# Patient Record
Sex: FEMALE | Race: BLACK OR AFRICAN AMERICAN | Employment: UNEMPLOYED | URBAN - METROPOLITAN AREA
[De-identification: names, ages, dates, MRNs, and addresses within clinical notes are randomized per-mention and may not be internally consistent; named-entity substitution may affect disease eponyms.]

---

## 2019-02-07 ENCOUNTER — HOSPITAL ENCOUNTER (EMERGENCY)
Age: 29
Discharge: HOME | End: 2019-02-07
Payer: COMMERCIAL

## 2019-02-07 VITALS
HEART RATE: 98 BPM | SYSTOLIC BLOOD PRESSURE: 149 MMHG | RESPIRATION RATE: 15 BRPM | WEIGHT: 200 LBS | OXYGEN SATURATION: 100 % | TEMPERATURE: 98.2 F | HEIGHT: 67 IN | DIASTOLIC BLOOD PRESSURE: 75 MMHG | BODY MASS INDEX: 31.39 KG/M2

## 2019-02-07 DIAGNOSIS — L03.317 CELLULITIS OF BUTTOCK: Primary | ICD-10-CM

## 2019-02-07 PROCEDURE — 99282 EMERGENCY DEPT VISIT SF MDM: CPT

## 2019-02-07 RX ORDER — NAPROXEN 250 MG/1
500 TABLET ORAL 2 TIMES DAILY WITH MEALS
Qty: 20 TABLET | Refills: 0 | Status: SHIPPED | OUTPATIENT
Start: 2019-02-07

## 2019-02-07 RX ORDER — SULFAMETHOXAZOLE AND TRIMETHOPRIM 800; 160 MG/1; MG/1
1 TABLET ORAL 2 TIMES DAILY
Qty: 14 TABLET | Refills: 0 | Status: SHIPPED | OUTPATIENT
Start: 2019-02-07 | End: 2019-02-14

## 2019-02-07 RX ORDER — CEPHALEXIN 500 MG/1
500 CAPSULE ORAL 4 TIMES DAILY
Qty: 28 CAPSULE | Refills: 0 | Status: SHIPPED | OUTPATIENT
Start: 2019-02-07 | End: 2019-02-14

## 2019-02-07 ASSESSMENT — PAIN SCALES - GENERAL: PAINLEVEL_OUTOF10: 10

## 2019-02-08 ASSESSMENT — ENCOUNTER SYMPTOMS
NAUSEA: 0
COLOR CHANGE: 0
SHORTNESS OF BREATH: 0
ABDOMINAL PAIN: 0
VOMITING: 0
COUGH: 0
WHEEZING: 0
BACK PAIN: 0

## 2019-05-26 ENCOUNTER — APPOINTMENT (OUTPATIENT)
Dept: ULTRASOUND IMAGING | Age: 29
End: 2019-05-26
Payer: COMMERCIAL

## 2019-05-26 ENCOUNTER — APPOINTMENT (OUTPATIENT)
Dept: CT IMAGING | Age: 29
End: 2019-05-26
Payer: COMMERCIAL

## 2019-05-26 ENCOUNTER — HOSPITAL ENCOUNTER (EMERGENCY)
Age: 29
Discharge: HOME | End: 2019-05-26
Attending: EMERGENCY MEDICINE
Payer: COMMERCIAL

## 2019-05-26 VITALS
DIASTOLIC BLOOD PRESSURE: 73 MMHG | TEMPERATURE: 101.3 F | RESPIRATION RATE: 16 BRPM | BODY MASS INDEX: 31.39 KG/M2 | HEART RATE: 78 BPM | WEIGHT: 200 LBS | SYSTOLIC BLOOD PRESSURE: 130 MMHG | HEIGHT: 67 IN | OXYGEN SATURATION: 100 %

## 2019-05-26 DIAGNOSIS — J18.9 PNEUMONIA DUE TO ORGANISM: ICD-10-CM

## 2019-05-26 DIAGNOSIS — B96.89 BACTERIAL VAGINOSIS: Primary | ICD-10-CM

## 2019-05-26 DIAGNOSIS — N83.202 CYST OF LEFT OVARY: ICD-10-CM

## 2019-05-26 DIAGNOSIS — N76.0 BACTERIAL VAGINOSIS: Primary | ICD-10-CM

## 2019-05-26 LAB
A/G RATIO: 1.3 (ref 1.1–2.2)
ALBUMIN SERPL-MCNC: 3.8 G/DL (ref 3.4–5)
ALP BLD-CCNC: 53 U/L (ref 40–129)
ALT SERPL-CCNC: 9 U/L (ref 10–40)
ANION GAP SERPL CALCULATED.3IONS-SCNC: 10 MMOL/L (ref 3–16)
AST SERPL-CCNC: 16 U/L (ref 15–37)
BACTERIA WET PREP: ABNORMAL
BACTERIA: ABNORMAL /HPF
BASOPHILS ABSOLUTE: 0 K/UL (ref 0–0.2)
BASOPHILS RELATIVE PERCENT: 0.5 %
BILIRUB SERPL-MCNC: <0.2 MG/DL (ref 0–1)
BILIRUBIN URINE: NEGATIVE
BILIRUBIN URINE: NEGATIVE
BLOOD, URINE: ABNORMAL
BLOOD, URINE: NEGATIVE
BUN BLDV-MCNC: 10 MG/DL (ref 7–20)
CALCIUM SERPL-MCNC: 8.6 MG/DL (ref 8.3–10.6)
CHLORIDE BLD-SCNC: 106 MMOL/L (ref 99–110)
CLARITY: ABNORMAL
CLARITY: CLEAR
CLUE CELLS: ABNORMAL
CO2: 19 MMOL/L (ref 21–32)
COLOR: YELLOW
COLOR: YELLOW
CREAT SERPL-MCNC: 0.8 MG/DL (ref 0.6–1.1)
EOSINOPHILS ABSOLUTE: 0 K/UL (ref 0–0.6)
EOSINOPHILS RELATIVE PERCENT: 0.3 %
EPITHELIAL CELLS WET PREP: ABNORMAL
EPITHELIAL CELLS, UA: 24 /HPF (ref 0–5)
GFR AFRICAN AMERICAN: >60
GFR NON-AFRICAN AMERICAN: >60
GLOBULIN: 3 G/DL
GLUCOSE BLD-MCNC: 100 MG/DL (ref 70–99)
GLUCOSE URINE: NEGATIVE MG/DL
GLUCOSE URINE: NEGATIVE MG/DL
HCG QUALITATIVE: NEGATIVE
HCT VFR BLD CALC: 39.3 % (ref 36–48)
HEMOGLOBIN: 13.2 G/DL (ref 12–16)
HYALINE CASTS: 11 /LPF (ref 0–8)
KETONES, URINE: NEGATIVE MG/DL
KETONES, URINE: NEGATIVE MG/DL
LACTIC ACID: 1 MMOL/L (ref 0.4–2)
LEUKOCYTE ESTERASE, URINE: ABNORMAL
LEUKOCYTE ESTERASE, URINE: NEGATIVE
LIPASE: 16 U/L (ref 13–60)
LYMPHOCYTES ABSOLUTE: 0.7 K/UL (ref 1–5.1)
LYMPHOCYTES RELATIVE PERCENT: 10.7 %
MCH RBC QN AUTO: 29.4 PG (ref 26–34)
MCHC RBC AUTO-ENTMCNC: 33.7 G/DL (ref 31–36)
MCV RBC AUTO: 87.3 FL (ref 80–100)
MICROSCOPIC EXAMINATION: NORMAL
MICROSCOPIC EXAMINATION: YES
MONOCYTES ABSOLUTE: 0.3 K/UL (ref 0–1.3)
MONOCYTES RELATIVE PERCENT: 5.1 %
NEUTROPHILS ABSOLUTE: 5.3 K/UL (ref 1.7–7.7)
NEUTROPHILS RELATIVE PERCENT: 83.4 %
NITRITE, URINE: NEGATIVE
NITRITE, URINE: NEGATIVE
PDW BLD-RTO: 14.6 % (ref 12.4–15.4)
PH UA: 7.5 (ref 5–8)
PH UA: 8 (ref 5–8)
PLATELET # BLD: 169 K/UL (ref 135–450)
PMV BLD AUTO: 8.7 FL (ref 5–10.5)
POTASSIUM REFLEX MAGNESIUM: 4.5 MMOL/L (ref 3.5–5.1)
PROTEIN UA: 30 MG/DL
PROTEIN UA: NEGATIVE MG/DL
RBC # BLD: 4.5 M/UL (ref 4–5.2)
RBC UA: 4 /HPF (ref 0–4)
RBC WET PREP: ABNORMAL
SODIUM BLD-SCNC: 135 MMOL/L (ref 136–145)
SOURCE WET PREP: ABNORMAL
SPECIFIC GRAVITY UA: 1.03 (ref 1–1.03)
SPECIFIC GRAVITY UA: >1.03 (ref 1–1.03)
TOTAL PROTEIN: 6.8 G/DL (ref 6.4–8.2)
TRICHOMONAS PREP: ABNORMAL
URINE REFLEX TO CULTURE: NORMAL
URINE REFLEX TO CULTURE: YES
URINE TYPE: ABNORMAL
URINE TYPE: NORMAL
UROBILINOGEN, URINE: 1 E.U./DL
UROBILINOGEN, URINE: 1 E.U./DL
WBC # BLD: 6.3 K/UL (ref 4–11)
WBC UA: 52 /HPF (ref 0–5)
WBC WET PREP: ABNORMAL
YEAST WET PREP: ABNORMAL

## 2019-05-26 PROCEDURE — 85025 COMPLETE CBC W/AUTO DIFF WBC: CPT

## 2019-05-26 PROCEDURE — 6360000004 HC RX CONTRAST MEDICATION: Performed by: PHYSICIAN ASSISTANT

## 2019-05-26 PROCEDURE — 87040 BLOOD CULTURE FOR BACTERIA: CPT

## 2019-05-26 PROCEDURE — 81001 URINALYSIS AUTO W/SCOPE: CPT

## 2019-05-26 PROCEDURE — 76830 TRANSVAGINAL US NON-OB: CPT

## 2019-05-26 PROCEDURE — 83690 ASSAY OF LIPASE: CPT

## 2019-05-26 PROCEDURE — 6360000002 HC RX W HCPCS: Performed by: PHYSICIAN ASSISTANT

## 2019-05-26 PROCEDURE — 87086 URINE CULTURE/COLONY COUNT: CPT

## 2019-05-26 PROCEDURE — 87491 CHLMYD TRACH DNA AMP PROBE: CPT

## 2019-05-26 PROCEDURE — 83605 ASSAY OF LACTIC ACID: CPT

## 2019-05-26 PROCEDURE — 96374 THER/PROPH/DIAG INJ IV PUSH: CPT

## 2019-05-26 PROCEDURE — 87591 N.GONORRHOEAE DNA AMP PROB: CPT

## 2019-05-26 PROCEDURE — 74177 CT ABD & PELVIS W/CONTRAST: CPT

## 2019-05-26 PROCEDURE — 99284 EMERGENCY DEPT VISIT MOD MDM: CPT

## 2019-05-26 PROCEDURE — 87210 SMEAR WET MOUNT SALINE/INK: CPT

## 2019-05-26 PROCEDURE — 84703 CHORIONIC GONADOTROPIN ASSAY: CPT

## 2019-05-26 PROCEDURE — 81003 URINALYSIS AUTO W/O SCOPE: CPT

## 2019-05-26 PROCEDURE — 2580000003 HC RX 258: Performed by: PHYSICIAN ASSISTANT

## 2019-05-26 PROCEDURE — 6370000000 HC RX 637 (ALT 250 FOR IP): Performed by: PHYSICIAN ASSISTANT

## 2019-05-26 PROCEDURE — 80053 COMPREHEN METABOLIC PANEL: CPT

## 2019-05-26 PROCEDURE — 96361 HYDRATE IV INFUSION ADD-ON: CPT

## 2019-05-26 RX ORDER — ONDANSETRON 4 MG/1
4 TABLET, ORALLY DISINTEGRATING ORAL EVERY 8 HOURS PRN
Qty: 12 TABLET | Refills: 0 | Status: SHIPPED | OUTPATIENT
Start: 2019-05-26 | End: 2020-08-20

## 2019-05-26 RX ORDER — CEFUROXIME AXETIL 250 MG/1
500 TABLET ORAL ONCE
Status: COMPLETED | OUTPATIENT
Start: 2019-05-26 | End: 2019-05-26

## 2019-05-26 RX ORDER — AZITHROMYCIN 250 MG/1
250 TABLET, FILM COATED ORAL SEE ADMIN INSTRUCTIONS
Qty: 6 TABLET | Refills: 0 | Status: SHIPPED | OUTPATIENT
Start: 2019-05-26 | End: 2019-05-31

## 2019-05-26 RX ORDER — ACETAMINOPHEN 325 MG/1
650 TABLET ORAL ONCE
Status: COMPLETED | OUTPATIENT
Start: 2019-05-26 | End: 2019-05-26

## 2019-05-26 RX ORDER — 0.9 % SODIUM CHLORIDE 0.9 %
1000 INTRAVENOUS SOLUTION INTRAVENOUS ONCE
Status: COMPLETED | OUTPATIENT
Start: 2019-05-26 | End: 2019-05-26

## 2019-05-26 RX ORDER — ONDANSETRON 2 MG/ML
4 INJECTION INTRAMUSCULAR; INTRAVENOUS ONCE
Status: COMPLETED | OUTPATIENT
Start: 2019-05-26 | End: 2019-05-26

## 2019-05-26 RX ORDER — HYDROCODONE BITARTRATE AND ACETAMINOPHEN 5; 325 MG/1; MG/1
1 TABLET ORAL EVERY 6 HOURS PRN
Qty: 12 TABLET | Refills: 0 | Status: SHIPPED | OUTPATIENT
Start: 2019-05-26 | End: 2019-05-29

## 2019-05-26 RX ORDER — METRONIDAZOLE 250 MG/1
500 TABLET ORAL ONCE
Status: COMPLETED | OUTPATIENT
Start: 2019-05-26 | End: 2019-05-26

## 2019-05-26 RX ORDER — METRONIDAZOLE 500 MG/1
500 TABLET ORAL 2 TIMES DAILY
Qty: 14 TABLET | Refills: 0 | Status: SHIPPED | OUTPATIENT
Start: 2019-05-26 | End: 2019-06-02

## 2019-05-26 RX ADMIN — ONDANSETRON 4 MG: 2 INJECTION INTRAMUSCULAR; INTRAVENOUS at 13:07

## 2019-05-26 RX ADMIN — ACETAMINOPHEN 650 MG: 325 TABLET, FILM COATED ORAL at 13:08

## 2019-05-26 RX ADMIN — SODIUM CHLORIDE 1000 ML: 9 INJECTION, SOLUTION INTRAVENOUS at 13:07

## 2019-05-26 RX ADMIN — CEFUROXIME AXETIL 500 MG: 250 TABLET ORAL at 15:20

## 2019-05-26 RX ADMIN — METRONIDAZOLE 500 MG: 250 TABLET, FILM COATED ORAL at 15:21

## 2019-05-26 RX ADMIN — IOPAMIDOL 75 ML: 755 INJECTION, SOLUTION INTRAVENOUS at 14:23

## 2019-05-26 ASSESSMENT — ENCOUNTER SYMPTOMS
CONSTIPATION: 0
VOMITING: 1
BACK PAIN: 0
NAUSEA: 1
ABDOMINAL PAIN: 1
ABDOMINAL DISTENTION: 0
COUGH: 1
COLOR CHANGE: 0
DIARRHEA: 0
SHORTNESS OF BREATH: 0
ANAL BLEEDING: 0
BLOOD IN STOOL: 0

## 2019-05-26 ASSESSMENT — PAIN SCALES - GENERAL
PAINLEVEL_OUTOF10: 4
PAINLEVEL_OUTOF10: 7

## 2019-05-26 NOTE — ED NOTES
Pt updated on POC. Pt positioned for comfort. Call light in reach. Bed locked and in low position. Pt denies any needs or concerns at this time.      Janel Charles RN  05/26/19 5489

## 2019-05-26 NOTE — ED PROVIDER NOTES
I independently examined and evaluated Lamont Kub. In brief their history revealed patient with suprapubic abdominal pain, nausea, vomiting and fever. This started yesterday. She does have some vaginal discharge. She denies urinary frequency or urgency or dysuria. Their exam revealed tenderness to palpation in the suprapubic region. RAQUEL performed pelvic exam. No cervical motion tenderness but vaginal discharge noted. All diagnostic, treatment, and disposition decisions were made by myself in conjunction with the mid-level provider. Before disposition, questions were sought and answered with the patient. They have voiced understanding and agree with the plan. Patient's vital signs show that she is febrile. Her initial urinalysis is very contaminated. Patient was agreeable to obtaining a cath specimen. Her CBC and chemistry are normal. Her lactic acid is also normal. She has been given medications for her fever and pain. CT of the abdomen and pelvis shows possibly a ruptured hemorrhagic cyst and some fat stranding and free fluid in the lower pelvis region. Given her fever with an unknown source and this pain I have ordered a trans vaginal ultrasound to rule out TOA. Update: I did repeat the patient's urinalysis as it was contaminated. This shows no signs of infection or blood. The transvaginal ultrasound showed no discrete abscess and a complex left ovarian lesion of an evolving cyst. Specifically there is no signs of infection. CT of the abdomen and pelvis x-rays show some left-sided pneumonia. On my repeat evaluation the patient does state that she has been coughing and had some chest pain from the cough recently. She also noticed that was when the fever started and she started feeling warm. I do believe this is likely the cause of the patient's fever and she also has an ovarian cyst. Therefore we'll start the patient on azithromycin, nausea and pain medication as well as Flagyl for her bacterial vaginosis. I will have her follow-up with her PCP as well as the GYN clinic. Patient is return precautions. Final impression  Pneumonia  Bacterial vaginosis  Left ovarian cyst    For all further details of the patient's emergency department visit, please see the mid-level practitioner's documentation.         Shanthi Colón MD  05/26/19 2516

## 2019-05-26 NOTE — ED NOTES
Pt cathed for urine - urine labeled and sent to lab. Pt tolerated WNL. Pt aware of plan for ultrasound - denies needs at this time.       Lily Barnes RN  05/26/19 6257

## 2019-05-26 NOTE — ED NOTES
Patient discharged at this time in no acute distress after verbalizing understanding of discharge instructions and need for follow up with PCP .   Pt left ambulatory to discharge area after reviewing and receiving copy of ov AVS.   Patient education  Learner- Patient and family  Motivation and readiness to learn- Medium to High  Barriers to learning- None  Learning preference/provided instructions- Both written and verbal discharge instructions     Finn Ross RN  05/26/19 9189

## 2019-05-26 NOTE — ED PROVIDER NOTES
[facility]  eMERGENCY dEPARTMENT eNCOUnter        Pt Name: Vonnie Reynoso  MRN: [id number]  [place] 1990  Date of evaluation: 5/26/2019  Provider: YAZMIN Doll  PCP: Aaron Herrmann Dr       Chief Complaint   Patient presents with    Abdominal Pain     pt with c/o abd pain/ n/v - fever        HISTORY OF PRESENT ILLNESS   (Location/Symptom, Timing/Onset, Context/Setting, Quality, Duration, Modifying Factors, Severity)  Note limiting factors. Vonnie Reynoso is a 30 y.o. female who presents to the emergency department with complaints of suprapubic abdominal pain, nausea, vomiting and fevers that started yesterday. She also reports vaginal discharge but has not been sexually active so unable to report if she's had dyspareunia. Pain also located in the lower back. Denies any urinary frequency, urgency, dysuria or hematuria. She is yet to have anything for fever. Rates her pain 10 out of 10. Denies any aggravating or alleviating factors. No radiation of pain. Nursing Notes were all reviewed and agreed with or any disagreements were addressed  in the HPI. REVIEW OF SYSTEMS    (2-9 systems for level 4, 10 or more for level 5)     Review of Systems   Constitutional: Positive for chills, fatigue and fever. Respiratory: Positive for cough. Negative for shortness of breath. Cardiovascular: Negative for chest pain. Gastrointestinal: Positive for abdominal pain, nausea and vomiting. Negative for abdominal distention, anal bleeding, blood in stool, constipation and diarrhea. Genitourinary: Positive for pelvic pain and vaginal discharge. Negative for decreased urine volume, difficulty urinating, dysuria, flank pain, frequency, hematuria and urgency. Musculoskeletal: Negative for back pain and neck pain. Skin: Negative for color change and rash. Neurological: Negative for weakness and numbness.    All other systems reviewed and are negative. Positives and pertinent negatives as per HPI. All other systems were reviewed and are negative. PHYSICAL EXAM    (up to 7 for level 4, 8 or more for level 5)     ED Triage Vitals [05/26/19 1227]   BP Temp Temp Source Pulse Resp SpO2 Height Weight   (!) 122/91 101.3 °F (38.5 °C) Infrared 97 14 97 % 5' 7\" (1.702 m) 200 lb (90.7 kg)       Physical Exam   Constitutional: She is oriented to person, place, and time. She appears well-developed and well-nourished. She is active and cooperative. Non-toxic appearance. HENT:   Head: Normocephalic. Right Ear: External ear normal.   Left Ear: External ear normal.   Nose: Nose normal.   Eyes: Conjunctivae are normal. Right eye exhibits no discharge. Left eye exhibits no discharge. Neck: Normal range of motion. Neck supple. Cardiovascular: Normal rate, regular rhythm and normal heart sounds. Exam reveals no gallop and no friction rub. No murmur heard. Pulmonary/Chest: Effort normal and breath sounds normal. No stridor. No respiratory distress. She has no wheezes. She has no rales. Abdominal: Soft. Normal appearance and bowel sounds are normal. She exhibits no shifting dullness, no distension and no mass. There is tenderness in the suprapubic area. There is no guarding. Genitourinary: Vagina normal. Pelvic exam was performed with patient supine. There is no rash, tenderness, lesion or injury on the right labia. There is no rash, tenderness, lesion or injury on the left labia. Cervix exhibits no motion tenderness, no discharge and no friability. Right adnexum displays no mass, no tenderness and no fullness. Left adnexum displays no mass, no tenderness and no fullness. No vaginal discharge found. Genitourinary Comments: Vaginal exam performed sitting propped up on a bed pan, nurse at bedside. There is no cervical motion tenderness, cervical os is closed. No adnexal tenderness or fullness. Small amount of vaginal discharge appreciated. Musculoskeletal: Normal range of motion. Neurological: She is alert and oriented to person, place, and time. Skin: Skin is warm and dry. She is not diaphoretic. No pallor. Psychiatric: She has a normal mood and affect. Her behavior is normal.   Nursing note and vitals reviewed. PAST MEDICAL HISTORY   History reviewed. No pertinent past medical history. SURGICAL HISTORY     History reviewed. No pertinent surgical history. CURRENT MEDICATIONS       Discharge Medication List as of 5/26/2019  6:22 PM      CONTINUE these medications which have NOT CHANGED    Details   naproxen (NAPROSYN) 250 MG tablet Take 2 tablets by mouth 2 times daily (with meals), Disp-20 tablet, R-0Print      acetaminophen (TYLENOL) 500 MG tablet Take 500 mg by mouth every 6 hours as needed for Pain      Prenatal MV-Min-Fe Fum-FA-DHA (PRENATAL 1 PO) Take by mouth             ALLERGIES     Patient has no known allergies. FAMILY HISTORY     History reviewed. No pertinent family history.      SOCIAL HISTORY       Social History     Socioeconomic History    Marital status: Single     Spouse name: None    Number of children: None    Years of education: None    Highest education level: None   Occupational History    None   Social Needs    Financial resource strain: None    Food insecurity:     Worry: None     Inability: None    Transportation needs:     Medical: None     Non-medical: None   Tobacco Use    Smoking status: Current Every Day Smoker     Types: Cigarettes    Smokeless tobacco: Never Used   Substance and Sexual Activity    Alcohol use: Yes     Comment: occ    Drug use: No    Sexual activity: None   Lifestyle    Physical activity:     Days per week: None     Minutes per session: None    Stress: None   Relationships    Social connections:     Talks on phone: None     Gets together: None     Attends Evangelical service: None     Active member of club or organization: None     Attends meetings of clubs or organizations: None     Relationship status: None    Intimate partner violence:     Fear of current or ex partner: None     Emotionally abused: None     Physically abused: None     Forced sexual activity: None   Other Topics Concern    None   Social History Narrative    None       SCREENINGS           DIAGNOSTIC RESULTS   LABS:    Labs Reviewed   WET PREP, GENITAL - Abnormal; Notable for the following components:       Result Value    Clue Cells, Wet Prep <1+ (*)     All other components within normal limits    Narrative:     Performed at:  [facility] [address]   Phone [phone]   CBC WITH AUTO DIFFERENTIAL - Abnormal; Notable for the following components:    Lymphocytes # 0.7 (*)     All other components within normal limits    Narrative:     Performed at:  [facility] [address]   Phone [phone]   COMPREHENSIVE METABOLIC PANEL W/ REFLEX TO MG FOR LOW K - Abnormal; Notable for the following components:    Sodium 135 (*)     CO2 19 (*)     Glucose 100 (*)     ALT 9 (*)     All other components within normal limits    Narrative:     Performed at:  [facility] [address]   Phone [phone]   URINE RT REFLEX TO CULTURE - Abnormal; Notable for the following components:    Clarity, UA CLOUDY (*)     Blood, Urine TRACE (*)     Protein, UA 30 (*)     Leukocyte Esterase, Urine LARGE (*)     All other components within normal limits    Narrative:     Performed at:  [facility] [address]   Phone [phone]   MICROSCOPIC URINALYSIS - Abnormal; Notable for the following components:    Bacteria, UA 2+ (*)     Hyaline Casts, UA 11 (*)     WBC, UA 52 (*)     Epi Cells 24 (*)     All other components within normal limits    Narrative:     Performed at:  [facility]) - [facility] [place]  [address],  [place], [address]   Phone [phone]   CULTURE BLOOD #2   CULTURE BLOOD #1   C.TRACHOMATIS N.GONORRHOEAE DNA   URINE CULTURE   LIPASE    Narrative:     Performed at:  [facility]  [address], [address]   Phone [phone]   LACTIC ACID, PLASMA    Narrative:     Performed at:  [facility]  [address] [facility] [address]   Phone [phone]   HCG, SERUM, QUALITATIVE    Narrative:     Performed at:  [facility]  [address] [facility] [address]   Phone [phone]   URINE RT REFLEX TO CULTURE    Narrative:     Performed at:  [facility]  [address],  [place], [address] [employer] [address] [employer]   Phone [phone]       All other labs were within normal range or not returned as of this dictation. EKG: All EKG's areinterpreted by the Emergency Department Physician who either signs or Co-signs this chart in the absence of a cardiologist.    RADIOLOGY:   Non-plain film images such as CT, Ultrasound and MRI are read by the radiologist. Lurlene Rusty radiographicimages are visualized and preliminarily interpreted by the  ED Provider with the below findings:    Interpretation per the Radiologist below, if available at the time of this note:    US NON OB TRANSVAGINAL   Final Result   Small amount of nonspecific free fluid. No discrete abscess. Complex left ovarian lesion has the appearance of an involuting cyst.   Consider follow-up in 6-12 weeks to re-evaluate. CT ABDOMEN PELVIS W IV CONTRAST Additional Contrast? None   Final Result   1. Fat stranding and free fluid in the lower abdomen/pelvis with infiltration   of the small bowel mesentery and intra-abdominal fat along the anterior   pelvis.   Free fluid in the pelvis with crenulated peripherally hyperdense 1.8   cm left adnexal lesion which could represent a ruptured hemorrhagic cyst.   Further evaluation with pelvic ultrasound is recommended. 2. Mild nodular and ground-glass opacity in the lingula which could represent   infiltrate/pneumonia. 3. Normal gas-filled appendix. 4. Mild to moderate sigmoid diverticulosis. 5. Fluid-filled small bowel loops without overt small bowel dilatation or   small bowel obstruction. 6. Fatty liver. 7. Contracted gallbladder. 8. No hydronephrosis. 9. Bilateral pars interarticularis defects at L5-S1. No results found. PROCEDURES   Unless otherwisenoted below, none     Procedures    CRITICAL CARE TIME   N/A    CONSULTS:  None    EMERGENCY DEPARTMENT COURSE andDIFFERENTIAL DIAGNOSIS/MDM:   Vitals:    Vitals:    05/26/19 1716 05/26/19 1730 05/26/19 1800 05/26/19 1830   BP: 127/76 126/83 117/76 130/73   Pulse: 89   78   Resp: 16   16   Temp:       TempSrc:       SpO2: 97%   100%   Weight:       Height:           Patient wasgiven the following medications:  Medications   0.9 % sodium chloride bolus (0 mLs Intravenous Stopped 5/26/19 1516)   acetaminophen (TYLENOL) tablet 650 mg (650 mg Oral Given 5/26/19 1308)   ondansetron (ZOFRAN) injection 4 mg (4 mg Intravenous Given 5/26/19 1307)   iopamidol (ISOVUE-370) 76 % injection 75 mL (75 mLs Intravenous Given 5/26/19 1423)   metroNIDAZOLE (FLAGYL) tablet 500 mg (500 mg Oral Given 5/26/19 1521)   cefUROXime (CEFTIN) tablet 500 mg (500 mg Oral Given 5/26/19 1520)   Anisa Bob is a 30 y.o. female who presents to the emergency department with complaints of suprapubic abdominal pain, nausea, vomiting and fevers that started yesterday. She also reports vaginal discharge but has not been sexually active so unable to report if she's had dyspareunia. Pain also located in the lower back. Denies any urinary frequency, urgency, dysuria or hematuria. She is yet to have anything for fever. Rates her pain 10 out of 10. Denies any aggravating or alleviating factors.   No radiation of pain. Examination patient is having tenderness to suprapubic region, temperature was elevated at 101.3. Pulses normal, normal respirations and SPO2 on room air. Blood pressure is stable. Patient has a normal white count of 6.3, normal hemoglobin. Normal renal function and LFTs. A gases normal.  Negative pregnancy, large leukocytes and UA but also contaminated with 24 epithelial cells, 32 white cells and 2+ bacteria. Patient's wet prep does show evidence of bacterial vaginosis with clue cells. Trichomonas and yeast are negative. Patient likely with pyelonephritis, we'll treat with Ceftin and given Flagyl as well. CT imaging of abdomen and pelvis is currently pending. CT of the abdomen and pelvis shows possibly a ruptured hemorrhagic cyst and some fat stranding and flea fluid in the lower pelvis region, transvaginal ultrasound was ordered to rule out tubo-ovarian abscess. CT of the pelvis imaging did show some left-sided pneumonia. This could be what is causing her fever. Patient is started on Zithromax, Zofran, Norco on Flagyl for home. Outpatient follow-up with PCP, referred to GYN clinic. The patient tolerated their visit well. They were seen and evaluated by the attending physician, who agreed with the assessment and plan. I have discussed the findings of today's workup with the patient and addressed the patient's questions and concerns. Important warning signs as well as new orworsening symptoms which would necessitate immediate return to the ED were discussed. The plan is to discharge from the ED at this time, and the patient is in stable condition. The patient acknowledged understanding isagreeable with this plan. FINAL IMPRESSION      1. Bacterial vaginosis    2. Pneumonia due to organism    3.  Cyst of left ovary        DISPOSITION/PLAN   DISPOSITION  discharge home      PATIENT REFERRED TO:  [facility]    Schedule an appointment as soon as possible for a visit   If symptoms worsen    [address]  [address] [place] [address] [address]  Schedule an appointment as soon as possible for a visit   If symptoms worsen      DISCHARGE MEDICATIONS:  Discharge Medication List as of 5/26/2019  6:22 PM      START taking these medications    Details   metroNIDAZOLE (FLAGYL) 500 MG tablet Take 1 tablet by mouth 2 times daily for 7 days, Disp-14 tablet, R-0Print      HYDROcodone-acetaminophen (NORCO) 5-325 MG per tablet Take 1 tablet by mouth every 6 hours as needed for Pain for up to 3 days. Intended supply: 3 days.  Take lowest dose possible to manage pain, Disp-12 tablet, R-0Print      azithromycin (ZITHROMAX) 250 MG tablet Take 1 tablet by mouth See Admin Instructions for 5 days 500mg on day 1 followed by 250mg on days 2 - 5, Disp-6 tablet, R-0Print      ondansetron (ZOFRAN ODT) 4 MG disintegrating tablet Take 1 tablet by mouth every 8 hours as needed for Nausea or Vomiting, Disp-12 tablet, R-0Print             DISCONTINUED MEDICATIONS:  Discharge Medication List as of 5/26/2019  6:22 PM               (Please note that portions of this note were completed with a voice recognition program.  Efforts were made to edit the dictations but occasionally words aremis-transcribed.)    YAZMIN Olson (electronically signed)            YAZMIN Castro  05/27/19 1990

## 2019-05-26 NOTE — ED NOTES
-Blood specimens drawn at this time after verifying physician order using aseptic technique.  -Specimens labeled using patient identifiers per protocol.   -Pt tolerated well. Stretcher locked in lowest position with side rails up and call light within reach.   -Blood specimens sent to lab. -Patient medicated per IV as ordered by physician at this time.  -Will continue to monitor and re-evaluate.  -Bed locked in lowest position with side rails up and call light within reach.        Lien Coleman RN  05/26/19 8713

## 2019-05-27 LAB — URINE CULTURE, ROUTINE: NORMAL

## 2019-05-28 LAB
C TRACH DNA GENITAL QL NAA+PROBE: POSITIVE
N. GONORRHOEAE DNA: NEGATIVE

## 2019-05-31 LAB
BLOOD CULTURE, ROUTINE: NORMAL
CULTURE, BLOOD 2: NORMAL

## 2020-08-20 ENCOUNTER — HOSPITAL ENCOUNTER (EMERGENCY)
Age: 30
Discharge: HOME | End: 2020-08-20
Attending: EMERGENCY MEDICINE
Payer: COMMERCIAL

## 2020-08-20 ENCOUNTER — APPOINTMENT (OUTPATIENT)
Dept: GENERAL RADIOLOGY | Age: 30
End: 2020-08-20
Payer: COMMERCIAL

## 2020-08-20 VITALS
SYSTOLIC BLOOD PRESSURE: 149 MMHG | RESPIRATION RATE: 14 BRPM | DIASTOLIC BLOOD PRESSURE: 96 MMHG | OXYGEN SATURATION: 96 % | BODY MASS INDEX: 31.94 KG/M2 | HEART RATE: 90 BPM | TEMPERATURE: 97.9 F | HEIGHT: 67 IN | WEIGHT: 203.48 LBS

## 2020-08-20 PROCEDURE — 99283 EMERGENCY DEPT VISIT LOW MDM: CPT

## 2020-08-20 PROCEDURE — 73560 X-RAY EXAM OF KNEE 1 OR 2: CPT

## 2020-08-20 ASSESSMENT — PAIN DESCRIPTION - ONSET
ONSET: ON-GOING

## 2020-08-20 ASSESSMENT — PAIN DESCRIPTION - LOCATION
LOCATION: KNEE

## 2020-08-20 ASSESSMENT — PAIN DESCRIPTION - ORIENTATION
ORIENTATION: LEFT

## 2020-08-20 ASSESSMENT — PAIN DESCRIPTION - PAIN TYPE
TYPE: ACUTE PAIN

## 2020-08-20 ASSESSMENT — PAIN - FUNCTIONAL ASSESSMENT
PAIN_FUNCTIONAL_ASSESSMENT: ACTIVITIES ARE NOT PREVENTED
PAIN_FUNCTIONAL_ASSESSMENT: 0-10
PAIN_FUNCTIONAL_ASSESSMENT: ACTIVITIES ARE NOT PREVENTED
PAIN_FUNCTIONAL_ASSESSMENT: ACTIVITIES ARE NOT PREVENTED

## 2020-08-20 ASSESSMENT — PAIN SCALES - GENERAL
PAINLEVEL_OUTOF10: 6

## 2020-08-20 ASSESSMENT — PAIN DESCRIPTION - DESCRIPTORS
DESCRIPTORS: THROBBING
DESCRIPTORS: THROBBING

## 2020-08-20 NOTE — ED PROVIDER NOTES
Triage Chief Complaint:   Knee Injury (L knee, pain 8/10. Pt slipped on grease in kitchen last night, and injured L knee.)    [place]:  Luis Alberto Khan is a 35 y.o. female that presents evaluation of left knee pain which was sustained during a fall in her kitchen last night when she slipped and fell. No head strike no pain to any other body parts. Patient is able to bear weight but states is uncomfortable. No numbness    ROS:  At least 9 systems reviewed and otherwise acutely negative except as in the [address] No past medical history on file. No past surgical history on file. No family history on file.   Social History     Socioeconomic History    Marital status: Single     Spouse name: Not on file    Number of children: Not on file    Years of education: Not on file    Highest education level: Not on file   Occupational History    Not on file   Social Needs    Financial resource strain: Not on file    Food insecurity     Worry: Not on file     Inability: Not on file    Transportation needs     Medical: Not on file     Non-medical: Not on file   Tobacco Use    Smoking status: Current Every Day Smoker     Types: Cigarettes    Smokeless tobacco: Never Used   Substance and Sexual Activity    Alcohol use: Yes     Comment: occ    Drug use: No    Sexual activity: Not on file   Lifestyle    Physical activity     Days per week: Not on file     Minutes per session: Not on file    Stress: Not on file   Relationships    Social connections     Talks on phone: Not on file     Gets together: Not on file     Attends Sikhism service: Not on file     Active member of club or organization: Not on file     Attends meetings of clubs or organizations: Not on file     Relationship status: Not on file    Intimate partner violence     Fear of current or ex partner: Not on file     Emotionally abused: Not on file     Physically abused: Not on file     Forced sexual activity: Not on file   Other Topics Concern    Not on file   Social History Narrative    Not on file     No current facility-administered medications for this encounter. Current Outpatient Medications   Medication Sig Dispense Refill    ondansetron (ZOFRAN ODT) 4 MG disintegrating tablet Take 1 tablet by mouth every 8 hours as needed for Nausea or Vomiting 12 tablet 0    naproxen (NAPROSYN) 250 MG tablet Take 2 tablets by mouth 2 times daily (with meals) 20 tablet 0    acetaminophen (TYLENOL) 500 MG tablet Take 500 mg by mouth every 6 hours as needed for Pain      Prenatal MV-Min-Fe Fum-FA-DHA (PRENATAL 1 PO) Take by mouth       No Known Allergies    [unfilled]    Nursing Notes Reviewed    Physical Exam:  Vitals:    08/20/20 1919   BP: (!) 149/96   Pulse: 90   Resp: 14   Temp: 97.9 °F (36.6 °C)   SpO2: 96%       GENERAL APPEARANCE: Awake and alert. Cooperative. No acute distress. HEAD: Normocephalic. Atraumatic. EYES: EOM's grossly intact. Sclera anicteric. ENT: Mucous membranes are moist. Tolerates saliva. No trismus. NECK: Supple. No meningismus. Trachea midline. HEART: RRR. Radial pulses 2+. LUNGS: Respirations unlabored. CTAB  ABDOMEN: Soft. Non-tender. No guarding or rebound. EXTREMITIES:   Focused examination of the left lower extremity shows a well-perfused extremity with soft compartments, appropriate capillary refill, strong distal pulses. There is no deformity there is no crepitus. There is no ballotable patella. Range of motion both actively and passively is intact although she states is uncomfortable. No clinical effusion. The extremity is NVI  SKIN: Warm and dry. NEUROLOGICAL: No gross facial drooping. Moves all 4 extremities spontaneously. PSYCHIATRIC: Normal mood. I have reviewed and interpreted all of the currently available lab results from this visit (if applicable):  No results found for this visit on 08/20/20.      Radiographs (if obtained):  [] The following radiograph was interpreted by myself in the absence of a radiologist:  [x] Radiologist's Report Reviewed:     XR KNEE LEFT (1-2 VIEWS) (Final result)   Result time 08/20/20 19:52:00   Final result by Mary Ellen Barksdale MD (08/20/20 19:52:00)                 Impression:     Small joint effusion but no fracture or dislocation. Narrative:     EXAMINATION:   2 XRAY VIEWS OF THE LEFT KNEE     8/20/2020 7:22 pm     COMPARISON:   None. HISTORY:   ORDERING SYSTEM PROVIDED HISTORY: left knee pain s/p fall   TECHNOLOGIST PROVIDED HISTORY:   Reason for exam:->left knee pain s/p fall   Reason for Exam: Knee Injury (L knee, pain 8/10. Pt slipped on grease in   kitchen last night, and injured L knee.)   Acuity: Acute   Type of Exam: Initial   Mechanism of Injury: slipped yesterday on grease in kitchen     FINDINGS:   A small suprapatellar joint effusion is present.  Normal appearance of bones. No fracture demonstrated.  No dislocation                   EKG (if obtained): (All EKG's are interpreted by myself in the absence of a cardiologist)  Initial EKG on my interpretation shows n/a. MDM:  Differential diagnosis: Fracture, dislocation, ligament injury, tendon injury     I clearly have explained today's imaging does not rule out missed/occult fractures, nor ligamentous and/or tendonous injury and therefore patient must follow-up with orthopedics as referred for re-evaluation and possible advanced and/or repeat imaging. Will discharge with Ace wrap, RICE recs, weightbearing as tolerated and Ortho follow-up. Patient offered crutches but states she has some at home already    Discussed results, diagnosis and plan with patient and/or family. Questions addressed. Disposition and follow-up agreed upon. Specific discharge instructions explained. The patient and/or family and I have discussed the diagnosis and risks, and we agree with discharging home to follow-up with their primary care, specialist or referral doctor.   In the event that medications were prescribed the risk profile of these medications were detailed expressly. We also discussed returning to the Emergency Department immediately if new or worsening symptoms occur. We have discussed the symptoms which are most concerning that necessitate immediate return. Old records reviewed. Labs and imaging reviewed and results discussed with patient. .        Patient was given scripts for the following medications. I counseled patient how to take these medications. New Prescriptions    No medications on file         CRITICAL CARE TIME   Total Critical Care time was 0 minutes, excluding separately reportable procedures. There was a high probability of clinically significant/life threatening deterioration in the patient's condition which required my urgent intervention.       Clinical Impression:  Left knee injury  (Please note that portions of this note may have been completed with a voice recognition program. Efforts were made to edit the dictations but occasionally words are mis-transcribed.)    MD Brian Santos MD  08/20/20 2012

## 2020-08-20 NOTE — LETTER
[facility]      [address], [place], [place] [place]      To Whom It May Concern:    Suresh Grider [facility]2020.                                    Sincerely,        Mandie Concepcion RN

## 2020-08-20 NOTE — ED NOTES
Walked pt from [address] [facility] to ED bed. Obtained VS. Pt wearing mask, medic wearing mask, gloves, safety glasses.      Kassie Durant, EMT-P  08/20/20 2629